# Patient Record
Sex: MALE | Race: WHITE | NOT HISPANIC OR LATINO | Employment: FULL TIME | URBAN - METROPOLITAN AREA
[De-identification: names, ages, dates, MRNs, and addresses within clinical notes are randomized per-mention and may not be internally consistent; named-entity substitution may affect disease eponyms.]

---

## 2020-08-26 ENCOUNTER — HOSPITAL ENCOUNTER (EMERGENCY)
Facility: HOSPITAL | Age: 25
Discharge: HOME | End: 2020-08-26
Attending: EMERGENCY MEDICINE | Admitting: EMERGENCY MEDICINE
Payer: OTHER MISCELLANEOUS

## 2020-08-26 VITALS
SYSTOLIC BLOOD PRESSURE: 109 MMHG | HEART RATE: 67 BPM | RESPIRATION RATE: 18 BRPM | DIASTOLIC BLOOD PRESSURE: 66 MMHG | OXYGEN SATURATION: 99 % | WEIGHT: 151.01 LBS | TEMPERATURE: 96.4 F

## 2020-08-26 DIAGNOSIS — S81.811A LACERATION OF RIGHT LOWER LEG, INITIAL ENCOUNTER: Primary | ICD-10-CM

## 2020-08-26 PROCEDURE — 99282 EMERGENCY DEPT VISIT SF MDM: CPT

## 2020-08-26 PROCEDURE — 12002 RPR S/N/AX/GEN/TRNK2.6-7.5CM: CPT | Performed by: PHYSICIAN ASSISTANT

## 2020-08-26 PROCEDURE — 99284 EMERGENCY DEPT VISIT MOD MDM: CPT | Performed by: PHYSICIAN ASSISTANT

## 2020-08-26 RX ORDER — LIDOCAINE HYDROCHLORIDE AND EPINEPHRINE 20; 5 MG/ML; UG/ML
10 INJECTION, SOLUTION EPIDURAL; INFILTRATION; INTRACAUDAL; PERINEURAL ONCE
Status: COMPLETED | OUTPATIENT
Start: 2020-08-26 | End: 2020-08-26

## 2020-08-26 RX ORDER — CEPHALEXIN 500 MG/1
500 CAPSULE ORAL EVERY 6 HOURS SCHEDULED
Qty: 28 CAPSULE | Refills: 0 | Status: SHIPPED | OUTPATIENT
Start: 2020-08-26 | End: 2020-09-02

## 2020-08-26 RX ADMIN — LIDOCAINE HYDROCHLORIDE AND EPINEPHRINE 10 ML: 20; 5 INJECTION, SOLUTION EPIDURAL; INFILTRATION; INTRACAUDAL; PERINEURAL at 17:19

## 2020-08-26 NOTE — ED PROVIDER NOTES
History  Chief Complaint   Patient presents with    Laceration     50 min pta, tripped and cut leg with a wire  25 yo M presenting for evaluation of RLE laceration  Pt reports he was at work when he accidentally tripped over a wire causing laceration to the right lower leg  He reports the event occurred about 1 hour prior to arrival  Bleeding controlled easily after laceration occurred  Pt reports he is utd on tetanus  None       History reviewed  No pertinent past medical history  History reviewed  No pertinent surgical history  History reviewed  No pertinent family history  I have reviewed and agree with the history as documented  E-Cigarette/Vaping     E-Cigarette/Vaping Substances     Social History     Tobacco Use    Smoking status: Not on file    Smokeless tobacco: Never Used   Substance Use Topics    Alcohol use: Not Currently    Drug use: Not Currently       Review of Systems   All other systems reviewed and are negative  Physical Exam  Physical Exam  Vitals signs and nursing note reviewed  Constitutional:       General: He is not in acute distress  Appearance: He is well-developed  HENT:      Head: Normocephalic and atraumatic  Eyes:      Conjunctiva/sclera: Conjunctivae normal       Comments: EOM grossly intact   Neck:      Musculoskeletal: Normal range of motion and neck supple  Vascular: No JVD  Cardiovascular:      Rate and Rhythm: Normal rate  Pulmonary:      Effort: Pulmonary effort is normal    Abdominal:      Palpations: Abdomen is soft  Musculoskeletal:        Legs:       Comments: FROM, steady gait, cap refill brisk, strength and sensation grossly intact throughout   Skin:     General: Skin is warm and dry  Capillary Refill: Capillary refill takes less than 2 seconds  Neurological:      Mental Status: He is alert and oriented to person, place, and time     Psychiatric:         Behavior: Behavior normal          Vital Signs  ED Triage Vitals [08/26/20 1634]   Temperature Pulse Respirations Blood Pressure SpO2   (!) 96 4 °F (35 8 °C) 67 18 109/66 99 %      Temp Source Heart Rate Source Patient Position - Orthostatic VS BP Location FiO2 (%)   Tympanic -- -- -- --      Pain Score       Worst Possible Pain           Vitals:    08/26/20 1634   BP: 109/66   Pulse: 67         Visual Acuity      ED Medications  Medications   lidocaine-epinephrine (XYLOCAINE-MPF/EPINEPHRINE) 2 %-1:200,000 injection 10 mL (10 mL Infiltration Given 8/26/20 5559)       Diagnostic Studies  Results Reviewed     None                 No orders to display              Procedures  Laceration repair    Date/Time: 8/26/2020 5:28 PM  Performed by: Andrew Yang PA-C  Authorized by: Andrew Yang PA-C   Consent: Verbal consent obtained  Risks and benefits: risks, benefits and alternatives were discussed  Consent given by: patient  Required items: required blood products, implants, devices, and special equipment available  Patient identity confirmed: verbally with patient  Body area: lower extremity  Location details: right lower leg  Laceration length: 4 cm  Foreign bodies: no foreign bodies  Tendon involvement: none  Anesthesia: local infiltration    Anesthesia:  Local Anesthetic: lidocaine 2% with epinephrine    Sedation:  Patient sedated: no      Wound Dehiscence:  Superficial Wound Dehiscence: simple closure      Procedure Details:  Irrigation solution: saline  Amount of cleaning: standard  Debridement: none  Degree of undermining: none  Skin closure: 4-0 nylon  Number of sutures: 6  Technique: simple  Approximation: close  Approximation difficulty: simple  Dressing: gauze roll and 4x4 sterile gauze  Patient tolerance: Patient tolerated the procedure well with no immediate complications               ED Course       US AUDIT      Most Recent Value   Initial Alcohol Screen: US AUDIT-C    1  How often do you have a drink containing alcohol?   0 [he is in a half way house] Filed at: 08/26/2020 1635   2  How many drinks containing alcohol do you have on a typical day you are drinking? 0 Filed at: 08/26/2020 1635   3a  Male UNDER 65: How often do you have five or more drinks on one occasion? 0 Filed at: 08/26/2020 1635   3b  FEMALE Any Age, or MALE 65+: How often do you have 4 or more drinks on one occassion? 0 Filed at: 08/26/2020 1635   Audit-C Score  0 Filed at: 08/26/2020 1635                  BRITNEY/DAST-10      Most Recent Value   How many times in the past year have you    Used an illegal drug or used a prescription medication for non-medical reasons? Never Filed at: 08/26/2020 1636                                MDM  Number of Diagnoses or Management Options  Laceration of right lower leg, initial encounter:   Diagnosis management comments: 25 yo M presenting for evaluation of laceration to the RLE by wire, laceration was repaired with 6 simple interrupted sutures, pt tolerated procedure well, he does work as a , will prophylactically on keflex, pt is utd on tetanus, f/u with pcp as an outpatient, return in 10-14 days for removal    strict return to ED precautions discussed  Pt verbalizes understanding and agrees with plan  Pt is stable for discharge    Portions of the record may have been created with voice recognition software  Occasional wrong word or "sound a like" substitutions may have occurred due to the inherent limitations of voice recognition software  Read the chart carefully and recognize, using context, where substitutions have occurred            Disposition  Final diagnoses:   Laceration of right lower leg, initial encounter     Time reflects when diagnosis was documented in both MDM as applicable and the Disposition within this note     Time User Action Codes Description Comment    8/26/2020  5:29 PM Maximino Gomez Add [[id number]] Laceration of right lower leg, initial encounter       ED Disposition     ED Disposition Condition Date/Time Comment    Discharge Stable Wed Aug 26, 2020  5:29 PM Ke Carrasquillo [facility]  discharge to home/self care  Follow-up Information     Follow up With Specialties Details Why Contact Info Additional [address] Family Medicine Call in 1 day  [address], [address] [address]  [address], [address], [address] [place], [place], [address]    [facility] Emergency Department Emergency Medicine Go to  For suture removal [address] [address]  [address] [facility] Emergency Department Emergency Medicine Go to  If symptoms worsen [address] [address] [phone]           Patient's Medications   Discharge Prescriptions    CEPHALEXIN (KEFLEX) 500 MG CAPSULE    Take 1 capsule (500 mg total) by mouth every 6 (six) hours for 7 days       Start Date: 8/26/2020 End Date: 9/2/2020       Order Dose: 500 mg       Quantity: 28 capsule    Refills: 0     No discharge procedures on file      PDMP Review     None          ED Provider  Electronically Signed by           Ramon Price PA-C  08/26/20 5572

## 2020-08-31 ENCOUNTER — HOSPITAL ENCOUNTER (EMERGENCY)
Facility: HOSPITAL | Age: 25
Discharge: HOME | End: 2020-08-31
Attending: EMERGENCY MEDICINE | Admitting: EMERGENCY MEDICINE
Payer: MEDICARE

## 2020-08-31 VITALS
WEIGHT: 149 LBS | HEART RATE: 75 BPM | RESPIRATION RATE: 16 BRPM | OXYGEN SATURATION: 97 % | DIASTOLIC BLOOD PRESSURE: 58 MMHG | SYSTOLIC BLOOD PRESSURE: 108 MMHG | TEMPERATURE: 96.8 F

## 2020-08-31 DIAGNOSIS — Z51.89 VISIT FOR WOUND CHECK: Primary | ICD-10-CM

## 2020-08-31 DIAGNOSIS — S81.811D LACERATION OF RIGHT LOWER LEG, SUBSEQUENT ENCOUNTER: ICD-10-CM

## 2020-08-31 PROCEDURE — 99284 EMERGENCY DEPT VISIT MOD MDM: CPT | Performed by: PHYSICIAN ASSISTANT

## 2020-08-31 RX ORDER — CEPHALEXIN 500 MG/1
500 CAPSULE ORAL ONCE
Status: COMPLETED | OUTPATIENT
Start: 2020-08-31 | End: 2020-08-31

## 2020-08-31 RX ORDER — BACITRACIN, NEOMYCIN, POLYMYXIN B 400; 3.5; 5 [USP'U]/G; MG/G; [USP'U]/G
1 OINTMENT TOPICAL ONCE
Status: COMPLETED | OUTPATIENT
Start: 2020-08-31 | End: 2020-08-31

## 2020-08-31 RX ORDER — IBUPROFEN 200 MG
TABLET ORAL 3 TIMES DAILY
Qty: 28.3 G | Refills: 0 | Status: SHIPPED | OUTPATIENT
Start: 2020-08-31 | End: 2020-08-31

## 2020-08-31 RX ADMIN — CEPHALEXIN 500 MG: 500 CAPSULE ORAL at 14:15

## 2020-08-31 RX ADMIN — POLYMYXIN B SULFATE, BACITRACIN ZINC, NEOMYCIN SULFATE 1 LARGE APPLICATION: 5000; 3.5; 4 OINTMENT TOPICAL at 14:11

## 2020-08-31 NOTE — DISCHARGE INSTRUCTIONS
Please return in 5 days to have sutures removed  There was concern today for removing sutures to early as wound appears to be in early stages of healing and would not want to disrupt by removing sutures  Continue to keep the wound clean and dry and lightly dressed with clean bandage  Apply copious amounts of bacitracin at each bandage change  Pickup oral antibiotic at pharmacy and begin taking  Take antibiotic in full and as directed  Return to emergency room if symptoms worsen, develop new symptoms, or have concern about progress of your condition  Take all medication as directed  Contact your primary care provider in 48 hours for evaluation of the established treatment plan and discussion on the progress of your condition

## 2020-08-31 NOTE — ED PROVIDER NOTES
History  Chief Complaint   Patient presents with    Suture / Staple Removal     52-year-old male presents to the ED for wound check of a right anterior leg laceration 5 days ago  Laceration occurred while patient was at work accidentally struck leg against rebar  Patient was seen in the ED and had 7 external sutures placed, simple interrupted  During that visit patient also was prescribed Keflex, however patient has not yet picked up the prescription  Patient denies any fever, pain, and difficulty ambulating  Patient does endorse mild local redness and minimal serosanguineous discharge  Patient has not been keeping lacerated area covered  Patient is currently living at a long-term house  History provided by:  Patient   used: No        Prior to Admission Medications   Prescriptions Last Dose Informant Patient Reported? Taking? cephalexin (KEFLEX) 500 mg capsule   No No   Sig: Take 1 capsule (500 mg total) by mouth every 6 (six) hours for 7 days      Facility-Administered Medications: None       History reviewed  No pertinent past medical history  History reviewed  No pertinent surgical history  History reviewed  No pertinent family history  I have reviewed and agree with the history as documented  E-Cigarette/Vaping    E-Cigarette Use Never User      E-Cigarette/Vaping Substances     Social History     Tobacco Use    Smoking status: Current Every Day Smoker     Packs/day: 0 50    Smokeless tobacco: Never Used   Substance Use Topics    Alcohol use: Not Currently    Drug use: Not Currently       Review of Systems   Constitutional: Negative for chills and fever  Respiratory: Negative for cough and shortness of breath  Cardiovascular: Negative for chest pain and palpitations  Gastrointestinal: Negative for abdominal pain, diarrhea, nausea and vomiting  Genitourinary: Negative for dysuria     Musculoskeletal: Negative for arthralgias, back pain, gait problem, joint swelling and myalgias  Skin: Positive for color change (erythema around lacerated area) and wound (sutured laceration R anterior leg, 5 days ago)  Allergic/Immunologic: Negative for immunocompromised state  Neurological: Negative for dizziness, weakness, light-headedness, numbness and headaches  Hematological: Negative for adenopathy  Does not bruise/bleed easily  Physical Exam          Physical Exam  Vitals signs and nursing note reviewed  Constitutional:       General: He is not in acute distress  Appearance: Normal appearance  He is well-developed and normal weight  He is not diaphoretic  HENT:      Head: Normocephalic and atraumatic  Right Ear: External ear normal       Left Ear: External ear normal       Nose: Nose normal    Eyes:      General: No scleral icterus  Right eye: No discharge  Left eye: No discharge  Extraocular Movements: Extraocular movements intact  Conjunctiva/sclera: Conjunctivae normal       Pupils: Pupils are equal, round, and reactive to light  Neck:      Musculoskeletal: Normal range of motion  Trachea: No tracheal deviation  Cardiovascular:      Rate and Rhythm: Normal rate and regular rhythm  Pulses: Normal pulses  Heart sounds: Normal heart sounds  Comments: Distal medial tibialis pulse intact and equal bilaterally  Distal sensation intact and equal bilaterally  Full range of motion of lower extremities  Pulmonary:      Effort: Pulmonary effort is normal  No respiratory distress  Breath sounds: Normal breath sounds  No wheezing or rales  Musculoskeletal: Normal range of motion  General: Signs of injury (Right anterior shin laceration with sutures in place) present  No swelling  Right knee: He exhibits laceration and erythema  He exhibits normal range of motion, no swelling and no deformity  Legs:       Comments: Right anterior shin laceration with 7 external sutures in place  Granulated tissue present along laceration in between sutures, in early stages  Very minimal serosanguineous discharge noted  Localized surrounding erythema present  No red streaking  No localized swelling  No purulent discharge  Skin:     General: Skin is warm and dry  Capillary Refill: Capillary refill takes less than 2 seconds  Findings: Erythema ( localized erythema around lacerated area) present  No rash  Neurological:      Mental Status: He is alert and oriented to person, place, and time  Sensory: No sensory deficit  Psychiatric:         Behavior: Behavior normal          Vital Signs  ED Triage Vitals [08/31/20 1338]   Temperature Pulse Respirations Blood Pressure SpO2   (!) 96 8 °F (36 °C) 75 16 108/58 97 %      Temp Source Heart Rate Source Patient Position - Orthostatic VS BP Location FiO2 (%)   Tympanic Monitor Sitting Left arm --      Pain Score       --           Vitals:    08/31/20 1338   BP: 108/58   Pulse: 75   Patient Position - Orthostatic VS: Sitting         Visual Acuity      ED Medications  Medications   neomycin-bacitracin-polymyxin b (NEOSPORIN) ointment 1 large application (1 large application Topical Given by Other 8/31/20 1411)   cephalexin (KEFLEX) capsule 500 mg (500 mg Oral Given 8/31/20 1415)       Diagnostic Studies  Results Reviewed     None                 No orders to display              Procedures  Procedures         ED Course  ED Course as of Aug 31 1435   Mon Aug 31, 2020   159 78-year-old male presents to the ED for suture removal of right anterior leg  Patient had sutures placed 5 days ago  There is localized erythema and minimal serosanguineous discharge on presentation  Patient has not filled antibiotic prescription nor applying bacitracin  Provide wound care and dressing today and informed patient to return in 3-5 days for suture removal   There is slight concern for wound dehiscence if sutures are removed today    There is granulated tissue at linear laceration, but in early stages  Best to remove sutures in 10-14 days considering location and mechanism of injury  US AUDIT      Most Recent Value   Initial Alcohol Screen: US AUDIT-C    1  How often do you have a drink containing alcohol?  0 Filed at: 08/31/2020 1339   2  How many drinks containing alcohol do you have on a typical day you are drinking? 0 Filed at: 08/31/2020 1339   3a  Male UNDER 65: How often do you have five or more drinks on one occasion? 0 Filed at: 08/31/2020 1339   Audit-C Score  0 Filed at: 08/31/2020 1339                  BRITNEY/DAST-10      Most Recent Value   How many times in the past year have you    Used an illegal drug or used a prescription medication for non-medical reasons? Never Filed at: 08/31/2020 1339                                MDM  Number of Diagnoses or Management Options  Laceration of right lower leg, subsequent encounter: new and requires workup  Visit for wound check: new and requires workup  Diagnosis management comments: 71-year-old male presents for wound check of right anterior shin laceration had occurred 5 days ago  On examination there is mild localized erythema around the sutures and granulated tissue noted along laceration  Patient advised that that sutures should remain in place for an additional 3-5 days as there is concern for wound dehiscence and risk of infection if sutures are removed and wound begins to spread open  Patient was prescribed Keflex on initial visit to ED 5 days ago, however patient has not been able to  this medication  Advised patient to start Keflex as soon as possible  Patient was provided 1st dose of Keflex in the ED today, along with wound care that included bacitracin and nonadhesive dressing  Patient thoroughly instructed on wound care instructions  Patient advised to follow-up in 5 days for wound check and suture removal     Patient stable at discharge  Vital signs stable at discharge  Discussed the most likely cause of current symptoms  Discussed in detail any red flag signs and symptoms that would require immediate follow-up care in the emergency room  Instructed to follow-up with primary care provider for reevaluation  Discussed all prescribed medications to include doses, use, and route  Patient was satisfied with discharge plan and was provided the opportunity to inquire about any questions or concerns regarding ED visit  Amount and/or Complexity of Data Reviewed  Review and summarize past medical records: yes  Discuss the patient with other providers: yes    Risk of Complications, Morbidity, and/or Mortality  Presenting problems: moderate  Diagnostic procedures: moderate  Management options: moderate    Patient Progress  Patient progress: stable        Disposition  Final diagnoses:   Visit for wound check   Laceration of right lower leg, subsequent encounter     Time reflects when diagnosis was documented in both MDM as applicable and the Disposition within this note     Time User Action Codes Description Comment    8/31/2020  2:02 PM Luana Shaw [Z51 [id number]] Visit for wound check     8/31/2020  2:04 PM Luana Shaw [[id number]] Laceration of right lower leg, initial encounter     8/31/2020  2:04 PM Shirley Benítez [facility] Laceration of right lower leg, initial encounter     8/31/2020  2:04 PM Luana Shaw [[id number]] Laceration of right lower leg, subsequent encounter       ED Disposition     ED Disposition Condition Date/Time Comment    Discharge Stable Mon Aug 31, 2020  2:02 PM Marilu Castaneda [facility]  discharge to home/self care              Follow-up Information     Follow up With Specialties Details Why Contact Info Additional [address] Family Medicine Schedule an appointment as soon as possible for a visit  To establish a primary care provider [address] [place] [address] 627, [address] [facility] [address]  [address], [address], [address] [place], [place], [address]          Discharge Medication List as of 8/31/2020  2:07 PM      START taking these medications    Details   neomycin-bacitracin-polymyxin (NEOSPORIN) 5-400-5,000 ointment Apply topically 3 (three) times a day, Starting Mon 8/31/2020, Normal         CONTINUE these medications which have NOT CHANGED    Details   cephalexin (KEFLEX) 500 mg capsule Take 1 capsule (500 mg total) by mouth every 6 (six) hours for 7 days, Starting Wed 8/26/2020, Until Wed 9/2/2020, Normal           No discharge procedures on file      PDMP Review     None          ED Provider  Electronically Signed by           Vivian Currie PA-C  08/31/20 9075